# Patient Record
Sex: FEMALE | Race: WHITE | Employment: OTHER | URBAN - METROPOLITAN AREA
[De-identification: names, ages, dates, MRNs, and addresses within clinical notes are randomized per-mention and may not be internally consistent; named-entity substitution may affect disease eponyms.]

---

## 2021-01-01 ENCOUNTER — HOSPITAL ENCOUNTER (INPATIENT)
Age: 71
LOS: 1 days | DRG: 871 | End: 2021-12-20
Attending: EMERGENCY MEDICINE | Admitting: INTERNAL MEDICINE
Payer: COMMERCIAL

## 2021-01-01 ENCOUNTER — APPOINTMENT (OUTPATIENT)
Dept: GENERAL RADIOLOGY | Age: 71
DRG: 871 | End: 2021-01-01
Payer: COMMERCIAL

## 2021-01-01 VITALS
SYSTOLIC BLOOD PRESSURE: 90 MMHG | TEMPERATURE: 98.9 F | DIASTOLIC BLOOD PRESSURE: 50 MMHG | HEART RATE: 108 BPM | HEIGHT: 65 IN | RESPIRATION RATE: 28 BRPM | OXYGEN SATURATION: 78 % | WEIGHT: 122.36 LBS | BODY MASS INDEX: 20.39 KG/M2

## 2021-01-01 DIAGNOSIS — J12.82 PNEUMONIA DUE TO COVID-19 VIRUS: ICD-10-CM

## 2021-01-01 DIAGNOSIS — U07.1 PNEUMONIA DUE TO COVID-19 VIRUS: ICD-10-CM

## 2021-01-01 DIAGNOSIS — K92.2 GASTROINTESTINAL HEMORRHAGE, UNSPECIFIED GASTROINTESTINAL HEMORRHAGE TYPE: ICD-10-CM

## 2021-01-01 DIAGNOSIS — E86.0 DEHYDRATION, SEVERE: ICD-10-CM

## 2021-01-01 DIAGNOSIS — I95.9 HYPOTENSION, UNSPECIFIED HYPOTENSION TYPE: ICD-10-CM

## 2021-01-01 DIAGNOSIS — R09.02 HYPOXIA: ICD-10-CM

## 2021-01-01 DIAGNOSIS — N17.9 ACUTE RENAL FAILURE, UNSPECIFIED ACUTE RENAL FAILURE TYPE (HCC): Primary | ICD-10-CM

## 2021-01-01 DIAGNOSIS — E87.0 HYPERNATREMIA: ICD-10-CM

## 2021-01-01 LAB
A/G RATIO: 0.5 (ref 1.1–2.2)
A/G RATIO: 0.6 (ref 1.1–2.2)
ABO/RH: NORMAL
ALBUMIN SERPL-MCNC: 2.6 G/DL (ref 3.4–5)
ALBUMIN SERPL-MCNC: 2.7 G/DL (ref 3.4–5)
ALP BLD-CCNC: 57 U/L (ref 40–129)
ALP BLD-CCNC: 61 U/L (ref 40–129)
ALT SERPL-CCNC: 14 U/L (ref 10–40)
ALT SERPL-CCNC: 15 U/L (ref 10–40)
ANION GAP SERPL CALCULATED.3IONS-SCNC: 12 MMOL/L (ref 3–16)
ANION GAP SERPL CALCULATED.3IONS-SCNC: 13 MMOL/L (ref 3–16)
ANION GAP SERPL CALCULATED.3IONS-SCNC: 15 MMOL/L (ref 3–16)
ANION GAP SERPL CALCULATED.3IONS-SCNC: 23 MMOL/L (ref 3–16)
ANTIBODY SCREEN: NORMAL
APTT: 27.4 SEC (ref 26.2–38.6)
AST SERPL-CCNC: 21 U/L (ref 15–37)
AST SERPL-CCNC: 25 U/L (ref 15–37)
BACTERIA: ABNORMAL /HPF
BASE EXCESS ARTERIAL: -6.7 MMOL/L (ref -3–3)
BASE EXCESS ARTERIAL: -6.9 MMOL/L (ref -3–3)
BASOPHILS ABSOLUTE: 0 K/UL (ref 0–0.2)
BASOPHILS ABSOLUTE: 0.1 K/UL (ref 0–0.2)
BASOPHILS RELATIVE PERCENT: 0.1 %
BASOPHILS RELATIVE PERCENT: 0.8 %
BILIRUB SERPL-MCNC: 0.3 MG/DL (ref 0–1)
BILIRUB SERPL-MCNC: 0.3 MG/DL (ref 0–1)
BILIRUBIN URINE: ABNORMAL
BLOOD, URINE: NEGATIVE
BUN BLDV-MCNC: 71 MG/DL (ref 7–20)
BUN BLDV-MCNC: 76 MG/DL (ref 7–20)
BUN BLDV-MCNC: 80 MG/DL (ref 7–20)
BUN BLDV-MCNC: 97 MG/DL (ref 7–20)
C-REACTIVE PROTEIN: 101 MG/L (ref 0–5.1)
C-REACTIVE PROTEIN: 99 MG/L (ref 0–5.1)
CALCIUM SERPL-MCNC: 7.8 MG/DL (ref 8.3–10.6)
CALCIUM SERPL-MCNC: 8 MG/DL (ref 8.3–10.6)
CALCIUM SERPL-MCNC: 8.1 MG/DL (ref 8.3–10.6)
CALCIUM SERPL-MCNC: 8.6 MG/DL (ref 8.3–10.6)
CARBOXYHEMOGLOBIN ARTERIAL: 0.8 % (ref 0–1.5)
CARBOXYHEMOGLOBIN ARTERIAL: 1 % (ref 0–1.5)
CHLORIDE BLD-SCNC: 119 MMOL/L (ref 99–110)
CHLORIDE BLD-SCNC: 127 MMOL/L (ref 99–110)
CHLORIDE BLD-SCNC: 128 MMOL/L (ref 99–110)
CHLORIDE BLD-SCNC: 131 MMOL/L (ref 99–110)
CLARITY: ABNORMAL
CO2: 14 MMOL/L (ref 21–32)
CO2: 14 MMOL/L (ref 21–32)
CO2: 15 MMOL/L (ref 21–32)
CO2: 19 MMOL/L (ref 21–32)
COARSE CASTS, UA: ABNORMAL /LPF (ref 0–2)
COLOR: ABNORMAL
CREAT SERPL-MCNC: 2.3 MG/DL (ref 0.6–1.2)
CREAT SERPL-MCNC: 2.5 MG/DL (ref 0.6–1.2)
CREAT SERPL-MCNC: 2.6 MG/DL (ref 0.6–1.2)
CREAT SERPL-MCNC: 4.5 MG/DL (ref 0.6–1.2)
EKG ATRIAL RATE: 90 BPM
EKG DIAGNOSIS: NORMAL
EKG P AXIS: 58 DEGREES
EKG P-R INTERVAL: 112 MS
EKG Q-T INTERVAL: 418 MS
EKG QRS DURATION: 120 MS
EKG QTC CALCULATION (BAZETT): 511 MS
EKG R AXIS: 41 DEGREES
EKG T AXIS: 25 DEGREES
EKG VENTRICULAR RATE: 90 BPM
EOSINOPHILS ABSOLUTE: 0 K/UL (ref 0–0.6)
EOSINOPHILS ABSOLUTE: 0 K/UL (ref 0–0.6)
EOSINOPHILS RELATIVE PERCENT: 0 %
EOSINOPHILS RELATIVE PERCENT: 0.1 %
EPITHELIAL CELLS, UA: 26 /HPF (ref 0–5)
GFR AFRICAN AMERICAN: 12
GFR AFRICAN AMERICAN: 22
GFR AFRICAN AMERICAN: 23
GFR AFRICAN AMERICAN: 25
GFR NON-AFRICAN AMERICAN: 10
GFR NON-AFRICAN AMERICAN: 18
GFR NON-AFRICAN AMERICAN: 19
GFR NON-AFRICAN AMERICAN: 21
GLUCOSE BLD-MCNC: 118 MG/DL (ref 70–99)
GLUCOSE BLD-MCNC: 138 MG/DL (ref 70–99)
GLUCOSE BLD-MCNC: 144 MG/DL (ref 70–99)
GLUCOSE BLD-MCNC: 150 MG/DL (ref 70–99)
GLUCOSE BLD-MCNC: 155 MG/DL (ref 70–99)
GLUCOSE BLD-MCNC: 176 MG/DL (ref 70–99)
GLUCOSE BLD-MCNC: 190 MG/DL (ref 70–99)
GLUCOSE BLD-MCNC: 263 MG/DL (ref 70–99)
GLUCOSE BLD-MCNC: 282 MG/DL (ref 70–99)
GLUCOSE BLD-MCNC: 296 MG/DL (ref 70–99)
GLUCOSE URINE: NEGATIVE MG/DL
HCO3 ARTERIAL: 17.2 MMOL/L (ref 21–29)
HCO3 ARTERIAL: 17.2 MMOL/L (ref 21–29)
HCT VFR BLD CALC: 26.1 % (ref 36–48)
HCT VFR BLD CALC: 27.8 % (ref 36–48)
HCT VFR BLD CALC: 28.7 % (ref 36–48)
HCT VFR BLD CALC: 30.5 % (ref 36–48)
HCT VFR BLD CALC: 32.1 % (ref 36–48)
HEMOGLOBIN, ART, EXTENDED: 9.7 G/DL (ref 12–16)
HEMOGLOBIN, ART, EXTENDED: 9.8 G/DL (ref 12–16)
HEMOGLOBIN: 8.3 G/DL (ref 12–16)
HEMOGLOBIN: 8.6 G/DL (ref 12–16)
HEMOGLOBIN: 8.9 G/DL (ref 12–16)
HEMOGLOBIN: 9.5 G/DL (ref 12–16)
HEMOGLOBIN: 9.5 G/DL (ref 12–16)
INR BLD: 1.3 (ref 0.88–1.12)
INR BLD: 1.43 (ref 0.88–1.12)
KETONES, URINE: ABNORMAL MG/DL
L. PNEUMOPHILA SEROGP 1 UR AG: NORMAL
LACTIC ACID, SEPSIS: 1.4 MMOL/L (ref 0.4–1.9)
LACTIC ACID: 0.9 MMOL/L (ref 0.4–2)
LACTIC ACID: 1.3 MMOL/L (ref 0.4–2)
LACTIC ACID: 2.6 MMOL/L (ref 0.4–2)
LEUKOCYTE ESTERASE, URINE: NEGATIVE
LYMPHOCYTES ABSOLUTE: 0.2 K/UL (ref 1–5.1)
LYMPHOCYTES ABSOLUTE: 0.5 K/UL (ref 1–5.1)
LYMPHOCYTES RELATIVE PERCENT: 1.8 %
LYMPHOCYTES RELATIVE PERCENT: 3.7 %
MAGNESIUM: 2.6 MG/DL (ref 1.8–2.4)
MCH RBC QN AUTO: 27.7 PG (ref 26–34)
MCH RBC QN AUTO: 27.7 PG (ref 26–34)
MCHC RBC AUTO-ENTMCNC: 30.9 G/DL (ref 31–36)
MCHC RBC AUTO-ENTMCNC: 31 G/DL (ref 31–36)
MCV RBC AUTO: 89.3 FL (ref 80–100)
MCV RBC AUTO: 89.7 FL (ref 80–100)
METHEMOGLOBIN ARTERIAL: 0.6 %
METHEMOGLOBIN ARTERIAL: 0.8 %
MICROSCOPIC EXAMINATION: YES
MONOCYTES ABSOLUTE: 0.4 K/UL (ref 0–1.3)
MONOCYTES ABSOLUTE: 0.5 K/UL (ref 0–1.3)
MONOCYTES RELATIVE PERCENT: 3.6 %
MONOCYTES RELATIVE PERCENT: 3.8 %
NEUTROPHILS ABSOLUTE: 12.9 K/UL (ref 1.7–7.7)
NEUTROPHILS ABSOLUTE: 9.9 K/UL (ref 1.7–7.7)
NEUTROPHILS RELATIVE PERCENT: 91.6 %
NEUTROPHILS RELATIVE PERCENT: 94.5 %
NITRITE, URINE: NEGATIVE
O2 SAT, ARTERIAL: 90.5 %
O2 SAT, ARTERIAL: 93 %
O2 THERAPY: ABNORMAL
O2 THERAPY: ABNORMAL
OCCULT BLOOD DIAGNOSTIC: ABNORMAL
PCO2 ARTERIAL: 28.3 MMHG (ref 35–45)
PCO2 ARTERIAL: 28.8 MMHG (ref 35–45)
PDW BLD-RTO: 15.9 % (ref 12.4–15.4)
PDW BLD-RTO: 16.2 % (ref 12.4–15.4)
PERFORMED ON: ABNORMAL
PH ARTERIAL: 7.38 (ref 7.35–7.45)
PH ARTERIAL: 7.39 (ref 7.35–7.45)
PH UA: 5 (ref 5–8)
PLATELET # BLD: 362 K/UL (ref 135–450)
PLATELET # BLD: 503 K/UL (ref 135–450)
PMV BLD AUTO: 8.3 FL (ref 5–10.5)
PMV BLD AUTO: 8.6 FL (ref 5–10.5)
PO2 ARTERIAL: 61 MMHG (ref 75–108)
PO2 ARTERIAL: 70.8 MMHG (ref 75–108)
POTASSIUM REFLEX MAGNESIUM: 3.4 MMOL/L (ref 3.5–5.1)
POTASSIUM REFLEX MAGNESIUM: 4.1 MMOL/L (ref 3.5–5.1)
POTASSIUM SERPL-SCNC: 3.5 MMOL/L (ref 3.5–5.1)
POTASSIUM SERPL-SCNC: 3.8 MMOL/L (ref 3.5–5.1)
PRO-BNP: 3345 PG/ML (ref 0–124)
PROCALCITONIN: 0.13 NG/ML (ref 0–0.15)
PROTEIN UA: 30 MG/DL
PROTHROMBIN TIME: 14.8 SEC (ref 9.9–12.7)
PROTHROMBIN TIME: 16.4 SEC (ref 9.9–12.7)
RBC # BLD: 3.12 M/UL (ref 4–5.2)
RBC # BLD: 3.2 M/UL (ref 4–5.2)
RBC UA: 1 /HPF (ref 0–4)
SODIUM BLD-SCNC: 156 MMOL/L (ref 136–145)
SODIUM BLD-SCNC: 158 MMOL/L (ref 136–145)
SPECIFIC GRAVITY UA: 1.02 (ref 1–1.03)
STREP PNEUMONIAE ANTIGEN, URINE: NORMAL
TCO2 ARTERIAL: 18.1 MMOL/L
TCO2 ARTERIAL: 18.1 MMOL/L
TOTAL PROTEIN: 6.9 G/DL (ref 6.4–8.2)
TOTAL PROTEIN: 7.9 G/DL (ref 6.4–8.2)
TROPONIN: 0.04 NG/ML
URINE CULTURE, ROUTINE: NORMAL
URINE REFLEX TO CULTURE: YES
URINE TYPE: ABNORMAL
UROBILINOGEN, URINE: 0.2 E.U./DL
VITAMIN D 25-HYDROXY: 10.2 NG/ML
WBC # BLD: 10.5 K/UL (ref 4–11)
WBC # BLD: 14 K/UL (ref 4–11)
WBC UA: 14 /HPF (ref 0–5)

## 2021-01-01 PROCEDURE — 82270 OCCULT BLOOD FECES: CPT

## 2021-01-01 PROCEDURE — 96361 HYDRATE IV INFUSION ADD-ON: CPT

## 2021-01-01 PROCEDURE — C9113 INJ PANTOPRAZOLE SODIUM, VIA: HCPCS | Performed by: INTERNAL MEDICINE

## 2021-01-01 PROCEDURE — 2500000003 HC RX 250 WO HCPCS: Performed by: EMERGENCY MEDICINE

## 2021-01-01 PROCEDURE — 85610 PROTHROMBIN TIME: CPT

## 2021-01-01 PROCEDURE — 99284 EMERGENCY DEPT VISIT MOD MDM: CPT

## 2021-01-01 PROCEDURE — XW033H5 INTRODUCTION OF TOCILIZUMAB INTO PERIPHERAL VEIN, PERCUTANEOUS APPROACH, NEW TECHNOLOGY GROUP 5: ICD-10-PCS | Performed by: STUDENT IN AN ORGANIZED HEALTH CARE EDUCATION/TRAINING PROGRAM

## 2021-01-01 PROCEDURE — 86900 BLOOD TYPING SEROLOGIC ABO: CPT

## 2021-01-01 PROCEDURE — 6360000002 HC RX W HCPCS: Performed by: INTERNAL MEDICINE

## 2021-01-01 PROCEDURE — 36600 WITHDRAWAL OF ARTERIAL BLOOD: CPT

## 2021-01-01 PROCEDURE — 86140 C-REACTIVE PROTEIN: CPT

## 2021-01-01 PROCEDURE — 2580000003 HC RX 258: Performed by: INTERNAL MEDICINE

## 2021-01-01 PROCEDURE — 2000000000 HC ICU R&B

## 2021-01-01 PROCEDURE — 87449 NOS EACH ORGANISM AG IA: CPT

## 2021-01-01 PROCEDURE — 86901 BLOOD TYPING SEROLOGIC RH(D): CPT

## 2021-01-01 PROCEDURE — 82306 VITAMIN D 25 HYDROXY: CPT

## 2021-01-01 PROCEDURE — 80053 COMPREHEN METABOLIC PANEL: CPT

## 2021-01-01 PROCEDURE — 96360 HYDRATION IV INFUSION INIT: CPT

## 2021-01-01 PROCEDURE — 6370000000 HC RX 637 (ALT 250 FOR IP): Performed by: STUDENT IN AN ORGANIZED HEALTH CARE EDUCATION/TRAINING PROGRAM

## 2021-01-01 PROCEDURE — 85014 HEMATOCRIT: CPT

## 2021-01-01 PROCEDURE — 83880 ASSAY OF NATRIURETIC PEPTIDE: CPT

## 2021-01-01 PROCEDURE — 86850 RBC ANTIBODY SCREEN: CPT

## 2021-01-01 PROCEDURE — C9113 INJ PANTOPRAZOLE SODIUM, VIA: HCPCS | Performed by: EMERGENCY MEDICINE

## 2021-01-01 PROCEDURE — 85025 COMPLETE CBC W/AUTO DIFF WBC: CPT

## 2021-01-01 PROCEDURE — 83605 ASSAY OF LACTIC ACID: CPT

## 2021-01-01 PROCEDURE — 94660 CPAP INITIATION&MGMT: CPT

## 2021-01-01 PROCEDURE — 6360000002 HC RX W HCPCS: Performed by: EMERGENCY MEDICINE

## 2021-01-01 PROCEDURE — 85018 HEMOGLOBIN: CPT

## 2021-01-01 PROCEDURE — APPNB15 APP NON BILLABLE TIME 0-15 MINS: Performed by: NURSE PRACTITIONER

## 2021-01-01 PROCEDURE — 71045 X-RAY EXAM CHEST 1 VIEW: CPT

## 2021-01-01 PROCEDURE — 96367 TX/PROPH/DG ADDL SEQ IV INF: CPT

## 2021-01-01 PROCEDURE — 83735 ASSAY OF MAGNESIUM: CPT

## 2021-01-01 PROCEDURE — 84484 ASSAY OF TROPONIN QUANT: CPT

## 2021-01-01 PROCEDURE — 87040 BLOOD CULTURE FOR BACTERIA: CPT

## 2021-01-01 PROCEDURE — 6360000002 HC RX W HCPCS: Performed by: NURSE PRACTITIONER

## 2021-01-01 PROCEDURE — 36556 INSERT NON-TUNNEL CV CATH: CPT

## 2021-01-01 PROCEDURE — 96365 THER/PROPH/DIAG IV INF INIT: CPT

## 2021-01-01 PROCEDURE — 2700000000 HC OXYGEN THERAPY PER DAY

## 2021-01-01 PROCEDURE — 84145 PROCALCITONIN (PCT): CPT

## 2021-01-01 PROCEDURE — 2500000003 HC RX 250 WO HCPCS: Performed by: INTERNAL MEDICINE

## 2021-01-01 PROCEDURE — 93005 ELECTROCARDIOGRAM TRACING: CPT | Performed by: EMERGENCY MEDICINE

## 2021-01-01 PROCEDURE — 85730 THROMBOPLASTIN TIME PARTIAL: CPT

## 2021-01-01 PROCEDURE — 87086 URINE CULTURE/COLONY COUNT: CPT

## 2021-01-01 PROCEDURE — 36415 COLL VENOUS BLD VENIPUNCTURE: CPT

## 2021-01-01 PROCEDURE — 82803 BLOOD GASES ANY COMBINATION: CPT

## 2021-01-01 PROCEDURE — 96375 TX/PRO/DX INJ NEW DRUG ADDON: CPT

## 2021-01-01 PROCEDURE — 2580000003 HC RX 258: Performed by: EMERGENCY MEDICINE

## 2021-01-01 PROCEDURE — 81001 URINALYSIS AUTO W/SCOPE: CPT

## 2021-01-01 PROCEDURE — 93010 ELECTROCARDIOGRAM REPORT: CPT | Performed by: INTERNAL MEDICINE

## 2021-01-01 PROCEDURE — 99291 CRITICAL CARE FIRST HOUR: CPT | Performed by: INTERNAL MEDICINE

## 2021-01-01 RX ORDER — MORPHINE SULFATE 2 MG/ML
2 INJECTION, SOLUTION INTRAMUSCULAR; INTRAVENOUS
Status: DISCONTINUED | OUTPATIENT
Start: 2021-01-01 | End: 2021-01-01 | Stop reason: HOSPADM

## 2021-01-01 RX ORDER — INSULIN GLARGINE 100 [IU]/ML
20 INJECTION, SOLUTION SUBCUTANEOUS NIGHTLY
Status: DISCONTINUED | OUTPATIENT
Start: 2021-01-01 | End: 2021-01-01

## 2021-01-01 RX ORDER — QUETIAPINE FUMARATE 25 MG/1
25 TABLET, FILM COATED ORAL 2 TIMES DAILY
COMMUNITY

## 2021-01-01 RX ORDER — DEXTROSE MONOHYDRATE 25 G/50ML
12.5 INJECTION, SOLUTION INTRAVENOUS PRN
Status: DISCONTINUED | OUTPATIENT
Start: 2021-01-01 | End: 2021-01-01

## 2021-01-01 RX ORDER — DEXAMETHASONE SODIUM PHOSPHATE 10 MG/ML
10 INJECTION INTRAMUSCULAR; INTRAVENOUS EVERY 24 HOURS
Status: DISCONTINUED | OUTPATIENT
Start: 2021-12-24 | End: 2021-01-01

## 2021-01-01 RX ORDER — ACETAMINOPHEN 325 MG/1
650 TABLET ORAL EVERY 6 HOURS PRN
COMMUNITY

## 2021-01-01 RX ORDER — GUAIFENESIN/DEXTROMETHORPHAN 100-10MG/5
5 SYRUP ORAL EVERY 4 HOURS PRN
Status: DISCONTINUED | OUTPATIENT
Start: 2021-01-01 | End: 2021-01-01

## 2021-01-01 RX ORDER — DEXAMETHASONE SODIUM PHOSPHATE 4 MG/ML
4 INJECTION, SOLUTION INTRA-ARTICULAR; INTRALESIONAL; INTRAMUSCULAR; INTRAVENOUS; SOFT TISSUE ONCE
Status: COMPLETED | OUTPATIENT
Start: 2021-01-01 | End: 2021-01-01

## 2021-01-01 RX ORDER — DEXTROSE MONOHYDRATE 50 MG/ML
100 INJECTION, SOLUTION INTRAVENOUS PRN
Status: DISCONTINUED | OUTPATIENT
Start: 2021-01-01 | End: 2021-01-01

## 2021-01-01 RX ORDER — DEXTROSE AND SODIUM CHLORIDE 5; .45 G/100ML; G/100ML
INJECTION, SOLUTION INTRAVENOUS CONTINUOUS
Status: DISCONTINUED | OUTPATIENT
Start: 2021-01-01 | End: 2021-01-01

## 2021-01-01 RX ORDER — 0.9 % SODIUM CHLORIDE 0.9 %
30 INTRAVENOUS SOLUTION INTRAVENOUS PRN
Status: DISCONTINUED | OUTPATIENT
Start: 2021-01-01 | End: 2021-01-01

## 2021-01-01 RX ORDER — PANTOPRAZOLE SODIUM 40 MG/10ML
40 INJECTION, POWDER, LYOPHILIZED, FOR SOLUTION INTRAVENOUS DAILY
Status: DISCONTINUED | OUTPATIENT
Start: 2021-01-01 | End: 2021-01-01

## 2021-01-01 RX ORDER — POTASSIUM CHLORIDE 1.5 G/1.77G
20 POWDER, FOR SOLUTION ORAL DAILY
COMMUNITY

## 2021-01-01 RX ORDER — OMEPRAZOLE 20 MG/1
20 CAPSULE, DELAYED RELEASE ORAL DAILY
COMMUNITY

## 2021-01-01 RX ORDER — ASPIRIN 81 MG/1
81 TABLET, CHEWABLE ORAL DAILY
COMMUNITY

## 2021-01-01 RX ORDER — CASTOR OIL AND BALSAM, PERU 788; 87 MG/G; MG/G
OINTMENT TOPICAL 2 TIMES DAILY
Status: DISCONTINUED | OUTPATIENT
Start: 2021-01-01 | End: 2021-01-01

## 2021-01-01 RX ORDER — LISINOPRIL 5 MG/1
5 TABLET ORAL DAILY
COMMUNITY

## 2021-01-01 RX ORDER — ACETAMINOPHEN 650 MG/1
650 SUPPOSITORY RECTAL EVERY 6 HOURS PRN
Status: DISCONTINUED | OUTPATIENT
Start: 2021-01-01 | End: 2021-01-01

## 2021-01-01 RX ORDER — 0.9 % SODIUM CHLORIDE 0.9 %
1000 INTRAVENOUS SOLUTION INTRAVENOUS ONCE
Status: COMPLETED | OUTPATIENT
Start: 2021-01-01 | End: 2021-01-01

## 2021-01-01 RX ORDER — NICOTINE POLACRILEX 4 MG
15 LOZENGE BUCCAL PRN
Status: DISCONTINUED | OUTPATIENT
Start: 2021-01-01 | End: 2021-01-01

## 2021-01-01 RX ORDER — QUETIAPINE FUMARATE 25 MG/1
25 TABLET, FILM COATED ORAL 2 TIMES DAILY
Status: DISCONTINUED | OUTPATIENT
Start: 2021-01-01 | End: 2021-01-01 | Stop reason: HOSPADM

## 2021-01-01 RX ORDER — LORAZEPAM 2 MG/ML
4 INJECTION INTRAMUSCULAR
Status: DISCONTINUED | OUTPATIENT
Start: 2021-01-01 | End: 2021-01-01 | Stop reason: HOSPADM

## 2021-01-01 RX ORDER — LORAZEPAM 2 MG/ML
2 INJECTION INTRAMUSCULAR
Status: DISCONTINUED | OUTPATIENT
Start: 2021-01-01 | End: 2021-01-01 | Stop reason: HOSPADM

## 2021-01-01 RX ORDER — MORPHINE SULFATE 4 MG/ML
4 INJECTION, SOLUTION INTRAMUSCULAR; INTRAVENOUS
Status: DISCONTINUED | OUTPATIENT
Start: 2021-01-01 | End: 2021-01-01 | Stop reason: HOSPADM

## 2021-01-01 RX ORDER — SODIUM CHLORIDE 9 MG/ML
10 INJECTION INTRAVENOUS DAILY
Status: DISCONTINUED | OUTPATIENT
Start: 2021-01-01 | End: 2021-01-01

## 2021-01-01 RX ORDER — CILOSTAZOL 50 MG/1
50 TABLET ORAL 2 TIMES DAILY
COMMUNITY

## 2021-01-01 RX ORDER — ONDANSETRON 4 MG/1
4 TABLET, ORALLY DISINTEGRATING ORAL EVERY 8 HOURS PRN
Status: DISCONTINUED | OUTPATIENT
Start: 2021-01-01 | End: 2021-01-01

## 2021-01-01 RX ORDER — ACETAMINOPHEN 325 MG/1
650 TABLET ORAL EVERY 6 HOURS PRN
Status: DISCONTINUED | OUTPATIENT
Start: 2021-01-01 | End: 2021-01-01

## 2021-01-01 RX ORDER — SODIUM CHLORIDE 0.9 % (FLUSH) 0.9 %
5-40 SYRINGE (ML) INJECTION PRN
Status: DISCONTINUED | OUTPATIENT
Start: 2021-01-01 | End: 2021-01-01 | Stop reason: HOSPADM

## 2021-01-01 RX ORDER — POLYETHYLENE GLYCOL 3350 17 G/17G
17 POWDER, FOR SOLUTION ORAL DAILY PRN
Status: DISCONTINUED | OUTPATIENT
Start: 2021-01-01 | End: 2021-01-01

## 2021-01-01 RX ORDER — LANOLIN ALCOHOL/MO/W.PET/CERES
3 CREAM (GRAM) TOPICAL NIGHTLY
COMMUNITY

## 2021-01-01 RX ORDER — ONDANSETRON 2 MG/ML
4 INJECTION INTRAMUSCULAR; INTRAVENOUS EVERY 6 HOURS PRN
Status: DISCONTINUED | OUTPATIENT
Start: 2021-01-01 | End: 2021-01-01

## 2021-01-01 RX ORDER — CALCIUM GLUCONATE 20 MG/ML
1000 INJECTION, SOLUTION INTRAVENOUS ONCE
Status: DISCONTINUED | OUTPATIENT
Start: 2021-01-01 | End: 2021-01-01

## 2021-01-01 RX ORDER — SODIUM CHLORIDE 0.9 % (FLUSH) 0.9 %
5-40 SYRINGE (ML) INJECTION EVERY 12 HOURS SCHEDULED
Status: DISCONTINUED | OUTPATIENT
Start: 2021-01-01 | End: 2021-01-01 | Stop reason: HOSPADM

## 2021-01-01 RX ORDER — POTASSIUM CHLORIDE 29.8 MG/ML
20 INJECTION INTRAVENOUS
Status: COMPLETED | OUTPATIENT
Start: 2021-01-01 | End: 2021-01-01

## 2021-01-01 RX ORDER — SODIUM CHLORIDE 9 MG/ML
25 INJECTION, SOLUTION INTRAVENOUS PRN
Status: DISCONTINUED | OUTPATIENT
Start: 2021-01-01 | End: 2021-01-01

## 2021-01-01 RX ADMIN — INSULIN LISPRO 2 UNITS: 100 INJECTION, SOLUTION INTRAVENOUS; SUBCUTANEOUS at 03:56

## 2021-01-01 RX ADMIN — SODIUM BICARBONATE: 84 INJECTION, SOLUTION INTRAVENOUS at 08:54

## 2021-01-01 RX ADMIN — LORAZEPAM 4 MG: 2 INJECTION INTRAMUSCULAR; INTRAVENOUS at 15:44

## 2021-01-01 RX ADMIN — DEXAMETHASONE SODIUM PHOSPHATE 20 MG: 4 INJECTION, SOLUTION INTRAMUSCULAR; INTRAVENOUS at 20:08

## 2021-01-01 RX ADMIN — PANTOPRAZOLE SODIUM 40 MG: 40 INJECTION, POWDER, FOR SOLUTION INTRAVENOUS at 11:59

## 2021-01-01 RX ADMIN — DEXTROSE AND SODIUM CHLORIDE: 5; 450 INJECTION, SOLUTION INTRAVENOUS at 20:06

## 2021-01-01 RX ADMIN — INSULIN LISPRO 2 UNITS: 100 INJECTION, SOLUTION INTRAVENOUS; SUBCUTANEOUS at 08:56

## 2021-01-01 RX ADMIN — MORPHINE SULFATE 2 MG: 2 INJECTION, SOLUTION INTRAMUSCULAR; INTRAVENOUS at 16:16

## 2021-01-01 RX ADMIN — SODIUM CHLORIDE 10 ML: 9 INJECTION INTRAMUSCULAR; INTRAVENOUS; SUBCUTANEOUS at 11:59

## 2021-01-01 RX ADMIN — Medication 2 MCG/MIN: at 12:57

## 2021-01-01 RX ADMIN — SODIUM CHLORIDE 8 MG/HR: 9 INJECTION, SOLUTION INTRAVENOUS at 03:56

## 2021-01-01 RX ADMIN — DEXTROSE AND SODIUM CHLORIDE: 5; 450 INJECTION, SOLUTION INTRAVENOUS at 03:55

## 2021-01-01 RX ADMIN — POTASSIUM CHLORIDE 20 MEQ: 29.8 INJECTION, SOLUTION INTRAVENOUS at 11:37

## 2021-01-01 RX ADMIN — Medication 10 ML: at 08:44

## 2021-01-01 RX ADMIN — POTASSIUM CHLORIDE 20 MEQ: 29.8 INJECTION, SOLUTION INTRAVENOUS at 13:23

## 2021-01-01 RX ADMIN — SODIUM CHLORIDE 1000 ML: 9 INJECTION, SOLUTION INTRAVENOUS at 10:08

## 2021-01-01 RX ADMIN — DEXTROSE AND SODIUM CHLORIDE: 5; 450 INJECTION, SOLUTION INTRAVENOUS at 13:00

## 2021-01-01 RX ADMIN — Medication 10 ML: at 20:11

## 2021-01-01 RX ADMIN — LORAZEPAM 2 MG: 2 INJECTION INTRAMUSCULAR; INTRAVENOUS at 16:16

## 2021-01-01 RX ADMIN — INSULIN GLARGINE 20 UNITS: 100 INJECTION, SOLUTION SUBCUTANEOUS at 22:20

## 2021-01-01 RX ADMIN — INSULIN LISPRO 6 UNITS: 100 INJECTION, SOLUTION INTRAVENOUS; SUBCUTANEOUS at 22:20

## 2021-01-01 RX ADMIN — MORPHINE SULFATE 4 MG: 4 INJECTION, SOLUTION INTRAMUSCULAR; INTRAVENOUS at 15:44

## 2021-01-01 RX ADMIN — SODIUM CHLORIDE 8 MG/HR: 9 INJECTION, SOLUTION INTRAVENOUS at 20:09

## 2021-01-01 RX ADMIN — DEXAMETHASONE SODIUM PHOSPHATE 4 MG: 4 INJECTION, SOLUTION INTRAMUSCULAR; INTRAVENOUS at 12:59

## 2021-01-01 RX ADMIN — SODIUM CHLORIDE 1000 ML: 9 INJECTION, SOLUTION INTRAVENOUS at 10:28

## 2021-01-01 RX ADMIN — SODIUM CHLORIDE 5 ML/HR: 9 INJECTION, SOLUTION INTRAVENOUS at 20:08

## 2021-01-01 ASSESSMENT — PAIN SCALES - PAIN ASSESSMENT IN ADVANCED DEMENTIA (PAINAD)
TOTALSCORE: 1
FACIALEXPRESSION: 0
BODYLANGUAGE: 1
BODYLANGUAGE: 0
FACIALEXPRESSION: 0
CONSOLABILITY: 0
CONSOLABILITY: 0
BODYLANGUAGE: 0
BREATHING: 1
BREATHING: 0
NEGVOCALIZATION: 1
BODYLANGUAGE: 0
TOTALSCORE: 8
NEGVOCALIZATION: 1
CONSOLABILITY: 2
BODYLANGUAGE: 2
BREATHING: 1
BREATHING: 1
TOTALSCORE: 1
TOTALSCORE: 3
NEGVOCALIZATION: 0
FACIALEXPRESSION: 2
CONSOLABILITY: 2
NEGVOCALIZATION: 1
NEGVOCALIZATION: 0
BREATHING: 1
BREATHING: 0
TOTALSCORE: 0
CONSOLABILITY: 0
FACIALEXPRESSION: 0
CONSOLABILITY: 0
TOTALSCORE: 8
NEGVOCALIZATION: 1
FACIALEXPRESSION: 2
FACIALEXPRESSION: 0
BODYLANGUAGE: 2

## 2021-01-01 ASSESSMENT — ENCOUNTER SYMPTOMS: SHORTNESS OF BREATH: 1

## 2021-12-19 PROBLEM — U07.1 PNEUMONIA DUE TO COVID-19 VIRUS: Status: ACTIVE | Noted: 2021-01-01

## 2021-12-19 PROBLEM — J12.82 PNEUMONIA DUE TO COVID-19 VIRUS: Status: ACTIVE | Noted: 2021-01-01

## 2021-12-19 NOTE — PROGRESS NOTES
Pt arrived to ICU bed 2110 via stretcher with RT Arley Canada and ER RN Tricia Denton. Bedside report received. Pt is responsive to pain, opens eyes to pain and moaning with care but unable to follow commands at this time. Pt on BIPAP 50% Rate 10 10/4 with oxygen saturation 94%. Upon arrival to ICU Dr Sujatha Norris on unit and made aware of new consult. Case discussed. MD stated he will see patient. Upon arrival patient found with large bowel movement loose appears  Dark brown with  smell of blood. Bed alarm activated.

## 2021-12-19 NOTE — ED TRIAGE NOTES
Patient admitted to ED via [facility] from [facility]. Per EMS, patient is COVID + and they were called this morning because patient was less responsive. Patient typically mentates, but is only responsive to pain at this time. EMS states that patient was 70% on 3L/min via nasal cannula. They placed her on a 15L/min via NRB. Patient is currently satting at 89% on NRB. Temperature is 100.5 rectally. BP is 79/55. HR is between . History of alzheimer's, COPD, CAD, HTN, and GERD.

## 2021-12-19 NOTE — ED PROVIDER NOTES
Schizoaffective disorder, unspecified ([facility] [address] [facility]     Unspecified abnormalities of gait and mobility          SURGICAL HISTORY   History reviewed. No pertinent surgical history. CURRENTMEDICATIONS       Previous Medications    No medications on file       ALLERGIES     Penicillins and Ibuprofen    FAMILY HISTORY     History reviewed. No pertinent family history. SOCIAL HISTORY       Social History     Socioeconomic History    Marital status: Single     Spouse name: None    Number of children: None    Years of education: None    Highest education level: None   Occupational History    None   Tobacco Use    Smoking status: Unknown If Ever Smoked    Smokeless tobacco: None   Substance and Sexual Activity    Alcohol use: None    Drug use: None    Sexual activity: None   Other Topics Concern    None   Social History Narrative    None     Social Determinants of Health     Financial Resource Strain:     Difficulty of Paying Living Expenses: Not on file   Food Insecurity:     Worried About Running Out of Food in the Last Year: Not on file    Alfredito of Food in the Last Year: Not on file   Transportation Needs:     Lack of Transportation (Medical): Not on file    Lack of Transportation (Non-Medical):  Not on file   Physical Activity:     Days of Exercise per Week: Not on file    Minutes of Exercise per Session: Not on file   Stress:     Feeling of Stress : Not on file   Social Connections:     Frequency of Communication with Friends and Family: Not on file    Frequency of Social Gatherings with Friends and Family: Not on file    Attends Worship Services: Not on file    Active Member of Clubs or Organizations: Not on file    Attends Club or Organization Meetings: Not on file    Marital Status: Not on file   Intimate Partner Violence:     Fear of Current or Ex-Partner: Not on file    Emotionally Abused: Not on file    Physically Abused: Not on file    Sexually Abused: Not on file   Housing Stability:     Unable to Pay for Housing in the Last Year: Not on file    Number of Places Lived in the Last Year: Not on file    Unstable Housing in the Last Year: Not on file       SCREENINGS             PHYSICAL EXAM    (up to 7 for level 4, 8 or more for level 5)     ED Triage Vitals [12/19/21 0944]   BP Temp Temp Source Pulse Resp SpO2 Height Weight   (!) 79/55 100.5 °F (38.1 °C) Rectal 103 27 91 % -- --      rectal temperature is 100.5 °F (38.1 °C). Her blood pressure is 139/48 (abnormal) and her pulse is 76. Her respiration is 14 and oxygen saturation is 91%. Physical Exam  Constitutional:       General: She is in acute distress. Appearance: She is well-developed. She is ill-appearing and toxic-appearing. She is not diaphoretic. Comments: This is a lethargic ill-appearing female. She does, however, have a gag reflex. HENT:      Head: Normocephalic and atraumatic. Right Ear: External ear normal.      Left Ear: External ear normal.      Mouth/Throat:      Mouth: Mucous membranes are dry. Eyes:      General: No scleral icterus. Right eye: No discharge. Left eye: No discharge. Neck:      Thyroid: No thyromegaly. Vascular: No JVD. Trachea: No tracheal deviation. Cardiovascular:      Rate and Rhythm: Normal rate and regular rhythm. Heart sounds: No murmur heard. No friction rub. No gallop. Pulmonary:      Effort: Tachypnea and respiratory distress present. Breath sounds: Decreased air movement present. No stridor. Rhonchi present. No wheezing or rales. Abdominal:      General: There is no distension. Palpations: Abdomen is soft. Tenderness: There is no abdominal tenderness. There is no guarding or rebound. Musculoskeletal:         General: No tenderness. Cervical back: Normal range of motion. Skin:     General: Skin is warm and dry. Findings: No rash (On exposed body surfaces).    Neurological:      General: No focal deficit present. Mental Status: She is unresponsive. Comments: With IV hydration mental status did improve to at least responding to painful stimuli. When she does she moves all 4 extremities symmetrically. Psychiatric:         Behavior: Behavior is uncooperative.          DIAGNOSTIC RESULTS   LABS:    Results for orders placed or performed during the hospital encounter of 12/19/21   CBC Auto Differential   Result Value Ref Range    WBC 14.0 (H) 4.0 - 11.0 K/uL    RBC 3.12 (L) 4.00 - 5.20 M/uL    Hemoglobin 8.6 (L) 12.0 - 16.0 g/dL    Hematocrit 27.8 (L) 36.0 - 48.0 %    MCV 89.3 80.0 - 100.0 fL    MCH 27.7 26.0 - 34.0 pg    MCHC 31.0 31.0 - 36.0 g/dL    RDW 16.2 (H) 12.4 - 15.4 %    Platelets 470 (H) 849 - 450 K/uL    MPV 8.3 5.0 - 10.5 fL    Neutrophils % 91.6 %    Lymphocytes % 3.7 %    Monocytes % 3.8 %    Eosinophils % 0.1 %    Basophils % 0.8 %    Neutrophils Absolute 12.9 (H) 1.7 - 7.7 K/uL    Lymphocytes Absolute 0.5 (L) 1.0 - 5.1 K/uL    Monocytes Absolute 0.5 0.0 - 1.3 K/uL    Eosinophils Absolute 0.0 0.0 - 0.6 K/uL    Basophils Absolute 0.1 0.0 - 0.2 K/uL   Comprehensive Metabolic Panel w/ Reflex to MG   Result Value Ref Range    Sodium 156 (H) 136 - 145 mmol/L    Potassium reflex Magnesium 4.1 3.5 - 5.1 mmol/L    Chloride 119 (H) 99 - 110 mmol/L    CO2 14 (LL) 21 - 32 mmol/L    Anion Gap 23 (H) 3 - 16    Glucose 138 (H) 70 - 99 mg/dL    BUN 97 (HH) 7 - 20 mg/dL    CREATININE 4.5 (H) 0.6 - 1.2 mg/dL    GFR Non-African American 10 (A) >60    GFR  12 (A) >60    Calcium 8.6 8.3 - 10.6 mg/dL    Total Protein 7.9 6.4 - 8.2 g/dL    Albumin 2.7 (L) 3.4 - 5.0 g/dL    Albumin/Globulin Ratio 0.5 (L) 1.1 - 2.2    Total Bilirubin 0.3 0.0 - 1.0 mg/dL    Alkaline Phosphatase 61 40 - 129 U/L    ALT 14 10 - 40 U/L    AST 21 15 - 37 U/L   Troponin   Result Value Ref Range    Troponin 0.04 (H) <0.01 ng/mL   Brain Natriuretic Peptide   Result Value Ref Range    Pro-BNP 3,345 (H) 0 - 124 pg/mL Protime-INR   Result Value Ref Range    Protime 14.8 (H) 9.9 - 12.7 sec    INR 1.30 (H) 0.88 - 1.12   APTT   Result Value Ref Range    aPTT 27.4 26.2 - 38.6 sec   Lactate, Sepsis   Result Value Ref Range    Lactic Acid, Sepsis 1.4 0.4 - 1.9 mmol/L   Urinalysis Reflex to Culture    Specimen: Urine, clean catch   Result Value Ref Range    Color, UA DK YELLOW Straw/Yellow    Clarity, UA TURBID (A) Clear    Glucose, Ur Negative Negative mg/dL    Bilirubin Urine MODERATE (A) Negative    Ketones, Urine TRACE (A) Negative mg/dL    Specific Gravity, UA 1.025 1.005 - 1.030    Blood, Urine Negative Negative    pH, UA 5.0 5.0 - 8.0    Protein, UA 30 (A) Negative mg/dL    Urobilinogen, Urine 0.2 <2.0 E.U./dL    Nitrite, Urine Negative Negative    Leukocyte Esterase, Urine Negative Negative    Microscopic Examination YES     Urine Type NotGiven     Urine Reflex to Culture Yes    Blood Gas, Arterial   Result Value Ref Range    pH, Arterial 7.392 7.350 - 7.450    pCO2, Arterial 28.3 (L) 35.0 - 45.0 mmHg    pO2, Arterial 70.8 (L) 75.0 - 108.0 mmHg    HCO3, Arterial 17.2 (L) 21.0 - 29.0 mmol/L    Base Excess, Arterial -6.7 (L) -3.0 - 3.0 mmol/L    Hemoglobin, Art, Extended 9.7 (L) 12.0 - 16.0 g/dL    O2 Sat, Arterial 93.0 >92 %    Carboxyhgb, Arterial 1.0 0.0 - 1.5 %    Methemoglobin, Arterial 0.6 <1.5 %    TCO2, Arterial 18.1 Not Established mmol/L    O2 Therapy Unknown    Blood Occult Stool #1   Result Value Ref Range    Occult Blood Diagnostic Result: POSITIVE  Normal range: Negative   (A)    Microscopic Urinalysis   Result Value Ref Range    Coarse Casts, UA 3-5 (A) 0 - 2 /LPF    Bacteria, UA 1+ (A) None Seen /HPF    WBC, UA 14 (H) 0 - 5 /HPF    RBC, UA 1 0 - 4 /HPF    Epithelial Cells, UA 26 (H) 0 - 5 /HPF   TYPE AND SCREEN   Result Value Ref Range    ABO/Rh O NEG     Antibody Screen NEG        All other labs were within normal range or not returned as of this dictation. EKG:  All EKG's are interpreted by the Emergency Department Physician who either signs orCo-signs this chart in the absence of a cardiologist.    EKG visualized preliminarily interpreted by myself. Rhythm is sinus. There is a incomplete right bundle branch block. Nonspecific ST findings but no signs of acute ST elevation or acutely ischemic appearing pattern. The axis is 41. The rate is 90. RADIOLOGY:   Non-plain film images such as CT, Ultrasound and MRI are read by the radiologist. [facility] radiographic images are visualized and preliminarily interpreted by the  EDProvider with the below findings:    XR CHEST PORTABLE    Result Date: 12/19/2021  EXAMINATION: ONE XRAY VIEW OF THE CHEST 12/19/2021 10:09 am COMPARISON: None. HISTORY: ORDERING SYSTEM PROVIDED HISTORY: infection TECHNOLOGIST PROVIDED HISTORY: Reason for exam:->infection Reason for Exam: Respiratory Distress (COVID+, responsive to pain FINDINGS: The cardiomediastinal silhouette is unremarkable status post median sternotomy. Aortic vascular calcification. Peripheral ground-glass opacification in the mid and lower lung fields. No pleural fluid or evidence of overt failure. Peripheral ground-glass pulmonary infiltrates most consistent with COVID-19 pneumonia. PROCEDURES   Unless otherwise noted below, none     Central Line    Date/Time: 12/19/2021 12:30 PM  Performed by: Narendra Moser MD  Authorized by: Narendra Moser MD     Consent:     Consent obtained:  Emergent situation  Pre-procedure details:     Hand hygiene: Hand hygiene performed prior to insertion      Sterile barrier technique: All elements of maximal sterile technique followed      Skin preparation:  2% chlorhexidine    Skin preparation agent: Skin preparation agent completely dried prior to procedure    Anesthesia (see MAR for exact dosages):      Anesthesia method:  Local infiltration    Local anesthetic:  Lidocaine 1% w/o epi  Procedure details:     Location:  R femoral    Procedural supplies:  Triple lumen Landmarks identified: yes      Ultrasound guidance: yes      Sterile ultrasound techniques: Sterile gel and sterile probe covers were used      Number of attempts:  1    Successful placement: yes    Post-procedure details:     Post-procedure:  Dressing applied and line sutured    Assessment:  Blood return through all ports and free fluid flow    Patient tolerance of procedure: Tolerated well, no immediate complications        CRITICAL CARE TIME   CRITICAL CARE: There was a high probability of clinically significant/life threatening deterioration in this patient's condition which required my urgent intervention. Total critical care time was 86 minutes. This excludes any time for separately reportable procedures. CONSULTS:  None    EMERGENCY DEPARTMENT COURSE and DIFFERENTIAL DIAGNOSIS/MDM:   Vitals:    Vitals:    12/19/21 1337 12/19/21 1353 12/19/21 1419 12/19/21 1437   BP: (!) 138/45 (!) 121/41  (!) 139/48   Pulse: 78 78  76   Resp: 14 14 16 14   Temp:       TempSrc:       SpO2: 94% 96%  91%       Patient was given the following medications:  Medications   pantoprazole (PROTONIX) injection 40 mg (40 mg IntraVENous Given 12/19/21 1159)     And   sodium chloride (PF) 0.9 % injection 10 mL (10 mLs IntraVENous Given 12/19/21 1159)   norepinephrine (LEVOPHED) 16 mg in dextrose 5% 250 mL infusion (2 mcg/min IntraVENous New Bag 12/19/21 1257)   dextrose 5 % and 0.45 % sodium chloride infusion ( IntraVENous New Bag 12/19/21 1300)   0.9 % sodium chloride bolus (0 mLs IntraVENous Stopped 12/19/21 1358)   0.9 % sodium chloride bolus (0 mLs IntraVENous Stopped 12/19/21 1359)   dexamethasone (DECADRON) injection 4 mg (4 mg IntraVENous Given 12/19/21 1259)       Patient's blood gas was surprisingly good although that PaO2 was actually while on BiPAP with 93% oxygen. So she clearly is very hypoxic but she is not hypercarbic or significantly acidotic on blood gas. She did have a bicarb of 14.  She was given a total of 3 L of normal saline. Blood pressure would transiently come up to the 90 systolic range then eventually we got one of the 119 so I was cautiously optimistic that she would not need pressors. She then also demonstrated a large melanotic stool and became hypotensive again. So, at that point she did get a central line placed. She was given Protonix. Type and screen was sent. She was started on Levophed and her blood pressure did stabilize. FINAL IMPRESSION      1. Acute renal failure, unspecified acute renal failure type ([address]    2. Hypotension, unspecified hypotension type    3. Pneumonia due to COVID-19 virus    4. Hypoxia    5. Dehydration, severe    6. Hypernatremia    7. Gastrointestinal hemorrhage, unspecified gastrointestinal hemorrhage type          DISPOSITION/PLAN    DISPOSITION Decision To Admit 12/19/2021 12:44:05 PM      PATIENT REFERRED TO:  No follow-up provider specified.     DISCHARGE MEDICATIONS:  New Prescriptions    No medications on file       DISCONTINUED MEDICATIONS:  Discontinued Medications    No medications on file              (Please note that portions of this note were completed with a voice recognition program.  Efforts were made to editthe dictations but occasionally words are mis-transcribed.)    John Colón MD (electronically signed)           John Colón MD  12/19/21 6688

## 2021-12-20 NOTE — H&P
Hospital Medicine History & Physical      PCP: Jacinto Robledo MD    Date of Admission: 12/19/2021    Date of Service: Pt seen/examined on 12/19/21 and Admitted to Inpatient     Chief Complaint:  SOB      History Of Present Illness: The patient is a 66 y.o. female who presents to [facility] with SOB. Pt is a resident of NH, recently tested positive for covid, was found with hypoxia and worsening SOOB and hence sent to the ER. In ER, found to have dark stools with GI bleed, became hypotensive, started on levophed and admitted to ICU    Past Medical History:        Diagnosis Date    Alzheimer's dementia ([facility] [address]     Anemia     Bipolar affective disorder, current episode manic ([address]     Cognitive communication deficit     COPD (chronic obstructive pulmonary disease) ([address]     Coronary atherosclerosis of native coronary artery     Generalized anxiety disorder     GERD without esophagitis     Hypertension     Other lack of coordination     Schizoaffective disorder, unspecified ([address]     Unspecified abnormalities of gait and mobility        Past Surgical History:    History reviewed. No pertinent surgical history. Medications Prior to Admission:    Prior to Admission medications    Not on File       Allergies:  Penicillins and Ibuprofen    Social History:  The patient currently lives in [address] [place] [address] [place]:   has no history on file for tobacco use. ETOH:   has no history on file for alcohol use. Family History:  Reviewed in detail and negative for DM, Early CAD, Cancer, CVA. Positive as follows:    History reviewed. No pertinent family history.     REVIEW OF SYSTEMS:   Could not be obtained    PHYSICAL EXAM:    BP (!) 123/94   Pulse 92   Temp 98.3 °F (36.8 °C) (Axillary)   Resp 21   Ht 5' 5\" (1.651 m)   Wt 121 lb 11.1 oz (55.2 kg)   SpO2 98%   BMI 20.25 kg/m²     General appearance: on BIPAP  HEENT Normal cephalic, atraumatic without obvious deformity. Pupils equal, round, and reactive to light. Extra ocular muscles intact. Conjunctivae/corneas clear. Neck: Supple, No jugular venous distention/bruits. Trachea midline without thyromegaly or adenopathy with full range of motion. Lungs: diminished b/l  Heart: Regular rate and rhythm with Normal S1/S2   Abdomen: Soft, non-tender or non-distended without rigidity or guarding and positive bowel sounds   Extremities: No clubbing, cyanosis, or edema bilaterally. Skin: Skin color, texture, turgor normal.  No rashes or lesions.   Neurologic: grossly non focal, could not be fully assessed  Mental status: could not be assessed  Capillary Refill: Acceptable  < 3 seconds  Peripheral Pulses: +3 Easily felt, not easily obliterated with pressure      CBC   Recent Labs     12/19/21  1006 12/19/21 2027   WBC 14.0*  --    HGB 8.6* 8.3*   HCT 27.8* 26.1*   *  --       RENAL  Recent Labs     12/19/21  1006   *   K 4.1   *   CO2 14*   BUN 97*   CREATININE 4.5*     LFT'S  Recent Labs     12/19/21  1006   AST 21   ALT 14   BILITOT 0.3   ALKPHOS 61     COAG  Recent Labs     12/19/21  1006   INR 1.30*     CARDIAC ENZYMES  Recent Labs     12/19/21  1006   TROPONINI 0.04*       U/A:    Lab Results   Component Value Date    COLORU DK YELLOW 12/19/2021    WBCUA 14 12/19/2021    RBCUA 1 12/19/2021    BACTERIA 1+ 12/19/2021    CLARITYU TURBID 12/19/2021    SPECGRAV 1.025 12/19/2021    LEUKOCYTESUR Negative 12/19/2021    BLOODU Negative 12/19/2021    GLUCOSEU Negative 12/19/2021       ABG    Lab Results   Component Value Date    JBN0DQJ 17.2 12/19/2021    BEART -6.7 12/19/2021    C2TQMECT 93.0 12/19/2021    PHART 7.392 12/19/2021    CDY7IEI 28.3 12/19/2021    PO2ART 70.8 12/19/2021    WUT6YYS 18.1 12/19/2021           Active Hospital Problems    Diagnosis Date Noted    Pneumonia due to COVID-19 virus [U07.1, J12.82] 12/19/2021         PHYSICIANS CERTIFICATION:    I certify that Armando Abad is expected to be hospitalized for > than 2 midnights based on the following assessment and plan:      ASSESSMENT/PLAN:    Acute hypoxic resp failure - placed on BIPAP in ER, admit to ICU, critical care consulted    Acute COVID 19 viral PNA - recently tested positive in NH. Started on dexa, pharmacy consulted for actemra vs baricitinib, check CRP and monitor inflammatory markers    Sepsis - POA, 2/2 above, placed on levophed, monitor    GI bleed - NPO for now, GI consulted, on PPI gtt, monitor hb    ARF - suspect pre-renal, started on IVF, monitor creat, avoid nephrotoxic meds, might need nephro eval if no improvement in creat    Hypernatremia - suspect 2/2 hypovolemia, started on IVF, monitor          DVT Prophylaxis: SCD  Diet: Diet NPO  Code Status: Full Code  PT/OT Eval Status: not indicated    Dispo - admit to Jake Alvares MD    Thank you Anola Schaumann, MD for the opportunity to be involved in this patient's care. If you have any questions or concerns please feel free to contact me at [phone].

## 2021-12-20 NOTE — CONSULTS
[facility]  Palliative Care   Consult Note    NAME:  Jong Contreras RECORD NUMBER:  [id number]  AGE: 66 y.o. GENDER: female  : 1950  TODAY'S DATE:  2021    Subjective     Reason for Consult:  goals of care and code status  Visit Type: Initial Consult      Antonia Fuentes is a 66 y.o. female referred by:  [x] Physician    PAST MEDICAL HISTORY      Diagnosis Date    Alzheimer's dementia ([facility] [address]     Anemia     Bipolar affective disorder, current episode manic ([facility]     Cognitive communication deficit     COPD (chronic obstructive pulmonary disease) ([facility] [address]     Coronary atherosclerosis of native coronary artery     Generalized anxiety disorder     GERD without esophagitis     Hypertension     Other lack of coordination     [facility]     Unspecified abnormalities of gait and mobility        PAST SURGICAL HISTORY  History reviewed. No pertinent surgical history. FAMILY HISTORY  History reviewed. No pertinent family history. SOCIAL HISTORY  Social History     Tobacco Use    Smoking status: Unknown If Ever Smoked    Smokeless tobacco: Not on file   Substance Use Topics    Alcohol use: Not on file    Drug use: Not on file       ALLERGIES  Allergies   Allergen Reactions    Penicillins Shortness Of Breath, Swelling, Hives and Other (See Comments)    Ibuprofen        MEDICATIONS  No current facility-administered medications on file prior to encounter.      Current Outpatient Medications on File Prior to Encounter   Medication Sig Dispense Refill    aspirin 81 MG chewable tablet Take 81 mg by mouth daily      cilostazol (PLETAL) 50 MG tablet Take 50 mg by mouth 2 times daily      albuterol-ipratropium (COMBIVENT RESPIMAT)  MCG/ACT AERS inhaler Inhale 2 puffs into the lungs every 6 hours as needed for Wheezing or Shortness of Breath      lisinopril (PRINIVIL;ZESTRIL) 5 MG tablet Take 5 mg by mouth daily      melatonin 3 MG TABS tablet Take 3 mg by mouth nightly      metoprolol tartrate (LOPRESSOR) 25 MG tablet Take 12.5 mg by mouth 2 times daily      omeprazole (PRILOSEC) 20 MG delayed release capsule Take 20 mg by mouth daily      potassium chloride (KLOR-CON) 20 MEQ packet Take 20 mEq by mouth daily      QUEtiapine (SEROQUEL) 25 MG tablet Take 25 mg by mouth 2 times daily      acetaminophen (TYLENOL) 325 MG tablet Take 650 mg by mouth every 6 hours as needed for Pain or Fever         Objective         BP (!) 167/54   Pulse 107   Temp 98.9 °F (37.2 °C) (Axillary)   Resp 26   Ht 5' 5\" (1.651 m)   Wt 122 lb 5.7 oz (55.5 kg)   SpO2 90%   BMI 20.36 kg/m²     Code Status: DNR CC    Advanced Directives: not completed only has one son Loretta Bal [phone]     Assessment        Management and Education    Persons available for education:     [] Self     [] Caregiver       [] Spouse       [x] Other Family Member   []  Other    Spiritual History:  notified: Yes,     Does the patient have a Primary Care Physician? Yes    Level of patient/caregiver understanding able to:        [x] Verbalize Understanding   [] Demonstrate Understanding       [] Teach Back       [] Needs Reinforcement     []  Other:      Teaching Time:  0hours  30 minutes     Plan        Social Service Consult Made:  Yes  Assistance filling out Living Will/HPOA:  No      Discharge Plan:  Education/support to family  Providing support for coping/adaptation/distress of family  Clarification of medical condition to patient and family  Code status clarified: [facility]  Palliative care orders introduced    Discharge Environment:  [] Hospice Consult Agency:  [] Inpatient Hospice    [] Home with Hospice Care   [] ECF with Hospice  [] ECF skilled care with Hospice to follow   [] Other:    Discussion: Patient admitted from ECF with shortness of breath, COVID positive currently on bipap, oxygen level dropping. I have called her son Elizabet Fagan to discuss goals of care.  He gives a history of his mom being in ECF for past couple of years after his father . He feels she did have early Alzheimer but was not diagnosed. We discussed code status and he knows she wants to be DNR CC and would want comfort care at this time. He will be up later to say his good bye and pursue comfort. Dr Larry Hurd informed      I will continue to follow Ms. Lima's care as needed. Thank you for allowing me to participate in the care of Ms. Lima .      Electronically signed by Devan Vaenssa RN, [address] [place] [address] on 2021 at 12:35 PM  [address] [place] [address]  Office: [phone]

## 2021-12-20 NOTE — CARE COORDINATION
[id number] [facility] Wound Ostomy Continence Nurse  Consult Note       NAME:  Ryland Benitez  MEDICAL RECORD NUMBER:  [id number]  AGE: 66 y.o. GENDER: female  : 1950  TODAY'S DATE:  2021    Subjective   Reason for WOCN Evaluation and Assessment: Suspected DTI POA      Ryland Benitez is a 66 y.o. female referred by:   [] Physician  [x] Nursing  [] Other:     Wound Identification:  Wound Type: pressure  Contributing Factors: chronic pressure and decreased mobility    Wound History: Wounds POA. Currently in ICU on bipap in COVID isolation. Current Wound Care Treatment:  Foam border    Patient Goal of Care:  [x] Wound Healing  [] Odor Control  [] Palliative Care  [] Pain Control   [] Other:         PAST MEDICAL HISTORY        Diagnosis Date    Alzheimer's dementia ([facility] [address]     Anemia     Bipolar affective disorder, current episode manic ([facility])     Cognitive communication deficit     COPD (chronic obstructive pulmonary disease) ([facility]     Coronary atherosclerosis of native coronary artery     Generalized anxiety disorder     GERD without esophagitis     Hypertension     Other lack of coordination     Schizoaffective disorder, unspecified ([facility] [address] [facility]     Unspecified abnormalities of gait and mobility        PAST SURGICAL HISTORY    History reviewed. No pertinent surgical history. FAMILY HISTORY    History reviewed. No pertinent family history. SOCIAL HISTORY    Social History     Tobacco Use    Smoking status: Unknown If Ever Smoked    Smokeless tobacco: Not on file   Substance Use Topics    Alcohol use: Not on file    Drug use: Not on file       ALLERGIES    Allergies   Allergen Reactions    Penicillins Shortness Of Breath, Swelling, Hives and Other (See Comments)    Ibuprofen        MEDICATIONS    No current facility-administered medications on file prior to encounter.      Current Outpatient Medications on File Prior to Encounter   Medication Sig Dispense Refill    aspirin 81 MG chewable tablet Take 81 mg by mouth daily      cilostazol (PLETAL) 50 MG tablet Take 50 mg by mouth 2 times daily      albuterol-ipratropium (COMBIVENT RESPIMAT)  MCG/ACT AERS inhaler Inhale 2 puffs into the lungs every 6 hours as needed for Wheezing or Shortness of Breath      lisinopril (PRINIVIL;ZESTRIL) 5 MG tablet Take 5 mg by mouth daily      melatonin 3 MG TABS tablet Take 3 mg by mouth nightly      metoprolol tartrate (LOPRESSOR) 25 MG tablet Take 12.5 mg by mouth 2 times daily      omeprazole (PRILOSEC) 20 MG delayed release capsule Take 20 mg by mouth daily      potassium chloride (KLOR-CON) 20 MEQ packet Take 20 mEq by mouth daily      QUEtiapine (SEROQUEL) 25 MG tablet Take 25 mg by mouth 2 times daily      acetaminophen (TYLENOL) 325 MG tablet Take 650 mg by mouth every 6 hours as needed for Pain or Fever         Objective    BP (!) 167/54   Pulse 107   Temp 98.9 °F (37.2 °C) (Axillary)   Resp 26   Ht 5' 5\" (1.651 m)   Wt 122 lb 5.7 oz (55.5 kg)   SpO2 90%   BMI 20.36 kg/m²     LABS:  WBC:    Lab Results   Component Value Date    WBC 10.5 12/20/2021     H/H:    Lab Results   Component Value Date    HGB 8.9 12/20/2021    HCT 28.7 12/20/2021     PTT:    Lab Results   Component Value Date    APTT 27.4 12/19/2021   [APTT}  PT/INR:    Lab Results   Component Value Date    PROTIME 16.4 12/20/2021    INR 1.43 12/20/2021     HgBA1c:  No results found for: LABA1C    Assessment   Maxwell Risk Score: Maxwell Scale Score: 10    Patient Active Problem List   Diagnosis Code    Pneumonia due to COVID-19 virus U07.1, J12.82    Acute renal failure ([facility] [address] N17.9    Dehydration, severe E86.0    Hypotension I95.9       Measurements:  Wound 12/20/21 Sacrum (Active)   Wound Image   12/20/21 1226   Wound Etiology Deep tissue/Injury 12/20/21 1226   Wound Length (cm) 2.5 cm 12/20/21 1226   Wound Width (cm) 1 cm 12/20/21 1226   Wound Surface Area (cm^2) 2.5 cm^2 12/20/21 1226   Wound Assessment Purple/maroon;Non-blanchable erythema 12/20/21 1226   Liyah-wound Assessment Denuded 12/20/21 1226   Number of days: 0     L elbow        Pt with AMS on bipap. Pt skin assessed. Pt has DTI to gluteal cleft on sacrum, redness to periwound skin. Possible DTI to L elbow. Skin protectant in place on pt bridge of nose. No skin breakdown currently. Specialty bed ordered. Plan   Plan of Care:    -moisture barrier to buttocks; venelex 2x daily and PRN  -turn and reposition every 2 hours  -elevate heels, apply liquid barrier film twice daily  Will continue to follow.     Specialty Bed Required : Yes   [x] Low Air Loss   [] Pressure Redistribution  [] Fluid Immersion  [] Bariatric  [] Total Pressure Relief  [] Other:     Current Diet: Diet NPO  ADULT TUBE FEEDING; Nasogastric; Standard with Fiber; Continuous; 20; Yes; 10; Q 4 hours; 40; 200; Q 4 hours  Dietician consult:  N/A    Discharge Plan:  Placement for patient upon discharge:TBD  Patient appropriate for Outpatient [address]: No    Referrals:  []   [] [facility]  [] Supplies  [] Other    Patient/Caregiver Teaching:  Level of patient/caregiver understanding able to:   [] Indicates understanding       [] Needs reinforcement  [] Unsuccessful      [] Verbal Understanding  [] Demonstrated understanding       [] No evidence of learning  [] Refused teaching         [x] N/A       Electronically signed by Dhara Cruz RN, on 12/20/2021 at 12:28 PM

## 2021-12-20 NOTE — FLOWSHEET NOTE
provided emotional support and ministerial presence for patient's son and girl friend at the end of life.

## 2021-12-20 NOTE — PROGRESS NOTES
I called [facility] and confirmed that the patient tested positive for covid on 2021. Electronically signed by Alo Mays RN on 2021 at 10:32 AM    I attempted to call the patient's sister, Mary Solis (listed on her nursing home paperwork) at [phone]. It rang once and then went to [facility] and I was unable to leave one because it was not set up, however, she did call back right away. I got a new number for Keith Gudio [phone]. She informed me that the contact Marine Friedman, on the nursing home paperwork, is . I updated her on the patients admission and condition. I called Keith Guido and was able to speak with him and update him on his mothers admission to the hospital, condition, and plan of care. I asked for consent for a PICC line and Actemra and he asked for some time to [facility] into these things\" because he wasn't sure of next steps. I did clarify her code status and informed him of her increasing oxygen demands and potentially needed to be intubated soon based on the increase in oxygen demands and he said that he \"knows her wishes but those are not his wishes\" and to \"continue with everything for now\" while he gets some things together. I did inform him that palliative care would be reaching out to him as well. I updated his contact information and removed Tiana Crystal as he in .   Electronically signed by Alo Mays RN on 2021 at 10:56 AM

## 2021-12-20 NOTE — PROGRESS NOTES
I attempted to call the patients emergency contact, her son, Loretta Bal at [phone] but it rang busy. On the patients paperwork sent by the nursing home she has another son, Darlin Sainz, listed as an emergency contact. I called him at [phone] and got a voicemail. I left one requesting a call back.   Electronically signed by Wendi Meneses RN on 12/20/2021 at 10:23 AM

## 2021-12-20 NOTE — PROGRESS NOTES
Cardiac time of death was called to [address] [place] [address] at [phone] and the body was released.   Referral number [id number]  Electronically signed by Handy Castillo RN on 12/20/2021 at 5:55 PM

## 2021-12-20 NOTE — SIGNIFICANT EVENT
Called to pronounce patient. 66yo woman with COVID PNA with severe hypoxia. Condition worsening. Comfort measures pursued. General appearance: unresponsive. HEENT pupils fixed and dilated. Neck: no breath sounds. Lungs: no breath sounds. Heart: no heart sounds. Mental status: unresponsive. Pt  on 2021 at 16:24. Please forward requests for additional documentation to the attending physician.      Jim Berkowitz MD  2021  5:36 PM

## 2021-12-20 NOTE — PROGRESS NOTES
4 Eyes Skin Assessment     NAME:  Teena Booth  YOB: 1950  MEDICAL RECORD NUMBER:  [id number]    The patient is being assess for  Shift Handoff    I agree that 2 RN's have performed a thorough Head to Toe Skin Assessment on the patient. ALL assessment sites listed below have been assessed. Areas assessed by both nurses:    Head, Face, Ears, Shoulders, Back, Chest, Arms, Elbows, Hands, Sacrum. Buttock, Coccyx, Ischium and Legs. Feet and Heels        Does the Patient have a Wound? Other Yes. Pt has bruising/purple non-blanchable area in intergluteal cleft. Wound care consult ordered.         Maxwell Prevention initiated:  Yes   Wound Care Orders initiated:  Yes    Pressure Injury (Stage 3,4, Unstageable, DTI, NWPT, and Complex wounds) if present place consult order under [de-identified] Yes    New and Established Ostomies if present place consult order under : NA      Nurse 1 eSignature: Electronically signed by Annel Thompson RN on 12/19/21 at 7:19 PM EST    **SHARE this note so that the co-signing nurse is able to place an eSignature**    Nurse 2 eSignature: Electronically signed by Shay Rose RN on 12/19/21 at 9:20 PM EST

## 2021-12-20 NOTE — CONSULTS
REASON FOR CONSULTATION/CC: covid, full code      Consult at request of Alyssa Long MD     PCP: Gisella Tuttle MD  Established Pulmonologist:  None    HISTORY OF PRESENT ILLNESS: Mana Patricio is a 66y.o. year old female with a history of  who presents with     Patient was brought to the emergency room yesterday secondary to decreased responsiveness and worsening hypoxemia. Patient is known to be Covid positive \"recently \"but not clear from documentation exactly when the patient tested positive. Patient started on BiPAP in the emergency room secondary to hypoxemia. Wake but not answering question       Limited history from the patient secondary to Alzheimer's    This note may have been  transcribed using [id number] [employer]. Please disregard any translational errors. Assessment:        COVID-19 pneumonia  Acute hypoxemic respiratory failure saturation less than 90% on room air  Sepsis secondary to viral infection  Acute renal failure  Metabolic acidosis  Hyponatremia  GI bleed  History of Alzheimer's  Chart history of COPD  History hypertension  Schizoaffective disorder with bipolar      Plan:      Hospital Day 1     COVID-19 pneumonia with acute hypoxemic respiratory failure   *  *Actemra x1  *Dexamethasone 20 mg daily x5 days then 10 mg December 24 x5 days  *Remdesivir ordered  *Continues on BiPAP 60% FiO2. *Extensive findings on chest x-ray. Concerning prognosis given age, extent of disease, hypernatremia, acute renal failure all the negative predictive for survival.  * worsening hypoxemia . Check ABG      Acute renal failure  *Continue IV fluids. Improving. Metabolic acidosis with hypernatremia  *Change half-normal saline to bicarb 2 A      Shock  *Weaned off Levophed.   Discontinue norepinephrine      GI bleed  *Protonix drip       Diabetes mellitus  *Lantus 20 units nightly  *Lispro sliding scale    Schizoaffective disorder with bipolar  * seroquel guaiFENesin-dextromethorphan, glucose, dextrose, glucagon (rDNA), dextrose    ALLERGIES:  Patient is allergic to penicillins and ibuprofen. REVIEW OF SYSTEMS:  Awake but not answering questions     Objective:   PHYSICAL EXAM:  Blood pressure (!) 165/52, pulse 106, temperature 99.8 °F (37.7 °C), temperature source Axillary, resp. rate 29, height 5' 5\" (1.651 m), weight 121 lb 11.1 oz (55.2 kg), SpO2 91 %.'  Gen: No distress. Eyes: PERRL. No sclera icterus. No conjunctival injection. ENT: No discharge. Pharynx clear. External appearance of ears and nose normal.  Neck: Trachea midline. No obvious mass. Resp: No accessory muscle use. No crackles. No wheezes. No rhonchi. CV: Regular rate. Regular rhythm. No murmur or rub. No edema. GI: Non-tender. Non-distended. No hernia. Skin: Warm, dry, normal texture and turgor. No nodule on exposed extremities. Lymph: No cervical LAD. No supraclavicular LAD. M/S: No cyanosis. No clubbing. No joint deformity. Neuro: Moves all four extremities.     Psych: wake       Data Reviewed:   LABS:  CBC:   Recent Labs     12/19/21  1006 12/19/21 2027 12/20/21  0400   WBC 14.0*  --   --    HGB 8.6* 8.3* 9.5*   HCT 27.8* 26.1* 30.5*   MCV 89.3  --   --    *  --   --      BMP:   Recent Labs     12/19/21  1006 12/20/21  0400   * 158*   K 4.1 3.5   * 128*   CO2 14* 15*   BUN 97* 80*   CREATININE 4.5* 2.6*     LIVER PROFILE:   Recent Labs     12/19/21  1006   AST 21   ALT 14   BILITOT 0.3   ALKPHOS 61     PT/INR:   Recent Labs     12/19/21  1006   PROTIME 14.8*   INR 1.30*     APTT:   Recent Labs     12/19/21  1006   APTT 27.4     UA:  Recent Labs     12/19/21  1154   COLORU DK YELLOW   PHUR 5.0   WBCUA 14*   RBCUA 1   BACTERIA 1+*   CLARITYU TURBID*   SPECGRAV 1.025   LEUKOCYTESUR Negative   UROBILINOGEN 0.2   BILIRUBINUR MODERATE*   BLOODU Negative   GLUCOSEU Negative     Recent Labs     12/19/21  1013   PHART 7.392   TRS9OKP 28.3*   PO2ART 70.8* Vent Information  Skin Assessment: Clean, dry, & intact  FiO2 : (S) 60 %  SpO2: 91 %  SpO2/FiO2 ratio: 151.67  I Time/ I Time %: 0.9 s  Mask Type: Full face mask  Mask Size: Medium    Radiology Review:  Pertinent images / reports were reviewed as a part of this visit. CT Chest w/ contrast: No results found for this or any previous visit. CT Chest w/o contrast: No results found for this or any previous visit. CTPA: No results found for this or any previous visit. CXR PA/LAT: No results found for this or any previous visit. CXR portable: Results for orders placed during the hospital encounter of 12/19/21    XR CHEST PORTABLE    Narrative  EXAMINATION:  ONE XRAY VIEW OF THE CHEST    12/19/2021 10:09 am    COMPARISON:  None. HISTORY:  ORDERING SYSTEM PROVIDED HISTORY: infection  TECHNOLOGIST PROVIDED HISTORY:  Reason for exam:->infection  Reason for Exam: Respiratory Distress (COVID+, responsive to pain    FINDINGS:  The cardiomediastinal silhouette is unremarkable status post median  sternotomy. Aortic vascular calcification. Peripheral ground-glass  opacification in the mid and lower lung fields. No pleural fluid or evidence  of overt failure. Impression  Peripheral ground-glass pulmonary infiltrates most consistent with COVID-19  pneumonia.

## 2021-12-20 NOTE — PROGRESS NOTES
[facility]    We were consulted for GI bleeding  Chart notes reviewed  The patient's clinical condition continues to deteriorate and the family is pursuing comfort care measures  We will not formally consult  Please call if there is a change in status/plans  Thank you

## 2021-12-20 NOTE — CARE COORDINATION
Case Management Assessment           Initial Evaluation                Date / Time of Evaluation: 12/20/2021 11:37 AM                 Assessment Completed by: Dominique Pickett RN     Pt is currently in the ICU on BiPAP. She has a history of dementia. A call was placed to her son, Sarmad Chen. Left a message requesting call back. Call placed to Mika Flowers [phone] to confirm level of care. Spoke with Conrad Farrar in admissions. Pt is a long-term care resident. She usually stays n the memory care unit. She is alert and oriented x 2 but confused. She needs assistance and cues for ADL's. She was moved to the COVID+ unit at the facility last week. She is welcome to return at discharge. Palliative care has been consulted.       Patient Name: Olivia Velazquez     YOB: 1950  Diagnosis: Hypernatremia [E87.0]  Hypoxia [R09.02]  Dehydration, severe [E86.0]  Hypotension, unspecified hypotension type [I95.9]  Acute renal failure, unspecified acute renal failure type ([facility] [address] [N17.9]  Gastrointestinal hemorrhage, unspecified gastrointestinal hemorrhage type [K92.2]  Pneumonia due to COVID-19 virus [U07.1, J12.82]     Date / Time: 12/19/2021  9:40 AM    Patient Admission Status: Inpatient    Current PCP: Drew Ramirez MD    Chart Reviewed: Yes  Patient/ Family Interviewed: Yes    Emergency Contacts:  Extended Emergency Contact Information  Primary Emergency Contact: Aldo Barriga  Mobile Phone: [phone]  Relation: Child  Secondary Emergency Contact: austinnica Nawaf  Mobile Phone: [phone]  Relation: Brother/Sister    Advance Directives:   Code Status: Full Code    Financial  Payor: MEDICARE / Plan: MEDICARE PART A AND B / Product Type: *No Product type* /     [facility] Needs assistance and cues  Support Systems:  LTC facility and family    HOUSING  Home Environment: LTCF  Steps: NONE    Plans to RETURN to current housing: Yes    Home Oxygen and Respiratory Equipment  Has HOME OXYGEN prior to admission: No    DISCHARGE PLAN:  Disposition: Return to long term [facility] for discharge: EMS transportation     The Patient and/or patient representative Candida Collado and her family were provided with a choice of provider and agrees with the discharge plan Yes    Freedom of choice list was provided with basic dialogue that supports the patient's individualized plan of care/goals and shares the quality data associated with the providers.  Yes      Tamara Oreilly RN  Case Management  [phone]

## 2021-12-20 NOTE — PROGRESS NOTES
I called the patient's son, Jerry Yanez, to let him know of the patients continued deterioration. Currently on bipap with pressure support of14/5 on 100% fiO2 and sating around 76-80%. She is now unresponsive and has mottling to her RLE. He said he will get in his car and head up to see her.   Electronically signed by Robina Huang RN on 12/20/2021 at 2:39 PM

## 2021-12-20 NOTE — PROGRESS NOTES
4 Eyes Skin Assessment     NAME:  Mayank Ho  YOB: 1950  MEDICAL RECORD NUMBER:  [id number]    The patient is being assess for  Transfer to New Unit    I agree that 2 RN's have performed a thorough Head to Toe Skin Assessment on the patient. ALL assessment sites listed below have been assessed. Areas assessed by both nurses:    Head, Face, Ears, Shoulders, Back, Chest, Arms, Elbows, Hands, Sacrum. Buttock, Coccyx, Ischium and Legs. Feet and Heels        Does the Patient have a Wound? Yes. Pt has bruising/purple non-blanchable area on intergluteal cleft. Wound consult ordered. Area in Head to toe assessment.        Maxwell Prevention initiated:  Yes   Wound Care Orders initiated:  YES    Pressure Injury (Stage 3,4, Unstageable, DTI, NWPT, and Complex wounds) if present place consult order under [de-identified] Yes    New and Established Ostomies if present place consult order under : NA      Nurse 1 eSignature: Electronically signed by Rachell Marx RN on 12/19/21 at 7:17 PM EST    **SHARE this note so that the co-signing nurse is able to place an eSignature**    Nurse 2 eSignature: Electronically signed by Randy Patton RN on 12/19/21 at 7:21 PM EST

## 2021-12-20 NOTE — PROGRESS NOTES
Patient's son and his girlfriend arrived to the unit. The son wished to enter the patients room. Risks of infection were explained to him and he still wished to proceed. Lissett Sal assisted the patient in donning the appropriate PPE. I discussed comfort medications with him after he entered the room and he said he would like her to receive them. Alyssa HARDY placed orders for comfort medications.   Electronically signed by Kay Dominguez RN on 12/20/2021 at 3:26 PM

## 2021-12-20 NOTE — PROGRESS NOTES
Attempted twice to call pt's son, Bridger , listed in emergency contacts to give updates on pt's condition but line was busy. Will try to follow up at a later time.     Electronically signed by Haim Roman RN on 12/19/2021 at 9:22 PM

## 2021-12-20 NOTE — PLAN OF CARE
Problem: Skin Integrity:  Goal: Will show no infection signs and symptoms  Description: Will show no infection signs and symptoms  Outcome: Ongoing  Goal: Absence of new skin breakdown  Description: Absence of new skin breakdown  Outcome: Ongoing     Problem: Falls - Risk of:  Goal: Will remain free from falls  Description: Will remain free from falls  Outcome: Ongoing  Goal: Absence of physical injury  Description: Absence of physical injury  Outcome: Ongoing     Problem: SAFETY  Goal: Free from accidental physical injury  Outcome: Ongoing  Goal: Free from intentional harm  Outcome: Ongoing     Problem: DAILY CARE  Goal: Daily care needs are met  Outcome: Ongoing     Problem: PAIN  Goal: Patient's pain/discomfort is manageable  Outcome: Ongoing     Problem: KNOWLEDGE DEFICIT  Goal: Patient/S.O. demonstrates understanding of disease process, treatment plan, medications, and discharge instructions. Outcome: Ongoing     Problem: DISCHARGE BARRIERS  Goal: Patient's continuum of care needs are met  Outcome: Ongoing     Problem: Airway Clearance - Ineffective  Goal: Achieve or maintain patent airway  Outcome: Ongoing     Problem: Gas Exchange - Impaired  Goal: Absence of hypoxia  Outcome: Ongoing  Goal: Promote optimal lung function  Outcome: Ongoing     Problem: Breathing Pattern - Ineffective  Goal: Ability to achieve and maintain a regular respiratory rate  Outcome: Ongoing     Problem:  Body Temperature -  Risk of, Imbalanced  Goal: Ability to maintain a body temperature within defined limits  Outcome: Ongoing  Goal: Will regain or maintain usual level of consciousness  Outcome: Ongoing  Goal: Complications related to the disease process, condition or treatment will be avoided or minimized  Outcome: Ongoing     Problem: Isolation Precautions - Risk of Spread of Infection  Goal: Prevent transmission of infection  Outcome: Ongoing     Problem: Nutrition Deficits  Goal: Optimize nutritional status  Outcome: Ongoing Problem: Risk for Fluid Volume Deficit  Goal: Maintain normal heart rhythm  Outcome: Ongoing  Goal: Maintain absence of muscle cramping  Outcome: Ongoing  Goal: Maintain normal serum potassium, sodium, calcium, phosphorus, and pH  Outcome: Ongoing     Problem: Loneliness or Risk for Loneliness  Goal: Demonstrate positive use of time alone when socialization is not possible  Outcome: Ongoing     Problem: Fatigue  Goal: Verbalize increase energy and improved vitality  Outcome: Ongoing     Problem: Patient Education: Go to Patient Education Activity  Goal: Patient/Family Education  Outcome: Ongoing

## 2021-12-20 NOTE — PROGRESS NOTES
Progress Note  Admit Date: 12/19/2021       CC:   Chief Complaint   Patient presents with    Respiratory Distress     COVID+, responsive to pain, 70% on 3L per EMS       Overnight Events: Pt seen and examined at bedside. Awake and alert to voice, though unable to answer most questions. Scheduled Medications:    potassium chloride  20 mEq IntraVENous Q1H    calcium gluconate-NaCl  1,000 mg IntraVENous Once    insulin lispro  0-18 Units SubCUTAneous Q4H    QUEtiapine  25 mg Oral BID    sodium chloride flush  5-40 mL IntraVENous 2 times per day    dexamethasone  20 mg IntraVENous Q24H    Followed by   Marquita Najjar ON 12/24/2021] dexamethasone  10 mg IntraVENous Q24H    insulin glargine  20 Units SubCUTAneous Nightly      PRN Medications: sodium chloride flush, sodium chloride, ondansetron **OR** ondansetron, polyethylene glycol, acetaminophen **OR** acetaminophen, guaiFENesin-dextromethorphan, glucose, dextrose, glucagon (rDNA), dextrose  Diet: Diet NPO  ADULT TUBE FEEDING; Nasogastric; Standard with Fiber; Continuous; 20; Yes; 10; Q 4 hours; 40; 200; Q 4 hours    Continuous Infusions:   sodium bicarbonate infusion 150 mL/hr at 12/20/21 0855    sodium chloride 5 mL/hr (12/19/21 2008)    pantoprazole 8 mg/hr (12/20/21 0855)    dextrose         PHYSICAL EXAM:  BP (!) 167/54   Pulse 107   Temp 98.9 °F (37.2 °C) (Axillary)   Resp (!) 33   Ht 5' 5\" (1.651 m)   Wt 122 lb 5.7 oz (55.5 kg)   SpO2 91%   BMI 20.36 kg/m²   Recent Labs     12/19/21  2043 12/19/21  2219 12/19/21  2343 12/20/21  0353 12/20/21  0832   POCGLU 296* 282* 263* 176* 150*       Intake/Output Summary (Last 24 hours) at 12/20/2021 1117  Last data filed at 12/20/2021 1003  Gross per 24 hour   Intake 2438.91 ml   Output 840 ml   Net 1598.91 ml       General appearance: No apparent distress, appears stated age. On BiPAP  HEENT: Pupils equal, round, and reactive to light. Conjunctivae/corneas clear. Neck: Supple, with full range of motion. No jugular venous distention. Trachea midline. Respiratory:  Normal respiratory effort. Clear to auscultation, bilaterally without Rales/Wheezes/Rhonchi. Cardiovascular: Tachycardic with regular rhythm with normal S1/S2 without murmurs, rubs or gallops. Abdomen: Soft, non-tender, non-distended with normal bowel sounds. Musculoskeletal: No clubbing, cyanosis or edema bilaterally. Full range of motion without deformity. Skin: Skin color, texture, turgor normal.  No rashes or lesions. Neurologic:  Moving extremities spontaneously  Psychiatric: Alert, cannot assess orientation    LABS:  Recent Labs     12/19/21  1006 12/19/21  1006 12/19/21 2027 12/20/21  0400 12/20/21  0620   WBC 14.0*  --   --   --  10.5   HGB 8.6*   < > 8.3* 9.5* 8.9*   HCT 27.8*   < > 26.1* 30.5* 28.7*   *  --   --   --  362    < > = values in this interval not displayed. Recent Labs     12/19/21  1006 12/20/21  0400 12/20/21  0620   * 158* 158*   K 4.1 3.5 3.4*   * 128* 131*   CO2 14* 15* 14*   BUN 97* 80* 76*   CREATININE 4.5* 2.6* 2.5*   GLUCOSE 138* 190* 144*     Recent Labs     12/19/21  1006 12/20/21  0620   AST 21 25   ALT 14 15   BILITOT 0.3 0.3   ALKPHOS 61 57     Recent Labs     12/19/21  1006   TROPONINI 0.04*     No results for input(s): BNP in the last 72 hours. No results for input(s): CHOL, HDL in the last 72 hours.     Invalid input(s): LDLCALCU  Recent Labs     12/19/21  1006 12/20/21  0620   INR 1.30* 1.43*       Assessment & Plan:      Septic Shock  COVID 19 Pneumonia  Acute hypoxemic respiratory failure   - placed on BiPAP in ER, admit to ICU, critical care consulted  - Remains on BiPAP  - recently tested positive in [place].   - Started on dexa, pharmacy consulted for actemra vs baricitinib  - Momentarily on Levophed, now off  - Palliative Care consult     GI bleed   - NPO for now  - GI consulted,   - PPI gtt  - monitor hb     JACLYN   -

## 2021-12-20 NOTE — ACP (ADVANCE CARE PLANNING)
Advance Care Planning     Advance Care Planning Activator (Inpatient)  Conversation Note      Date of ACP Conversation: 12/20/2021     Conversation Conducted with:  Healthcare Decision Maker: Next of Kin by law (only applies in absence of above) (name) Zac Cm    ACP Activator: Haley Luu RN    Health Care Decision Maker:     Current Designated Health Care Decision Maker:     Primary Decision Maker: Blayne Alexander - [phone]    The palliative care RN, Cleveland Lemus, spoke with pt's son, Soham Torres, regarding code status. Pt is to be a limited code; no intubation/reintubation, no defibrillation/cardioversion, no chest compressions, no resuscitative medications. Care Preferences    Ventilation: \"If you were in your present state of health and suddenly became very ill and were unable to breathe on your own, what would your preference be about the use of a ventilator (breathing machine) if it were available to you? \"      Would the patient desire the use of ventilator (breathing machine)?: no    \"If your health worsens and it becomes clear that your chance of recovery is unlikely, what would your preference be about the use of a ventilator (breathing machine) if it were available to you? \"     Would the patient desire the use of ventilator (breathing machine)?: No      Resuscitation  \"CPR works best to restart the heart when there is a sudden event, like a heart attack, in someone who is otherwise healthy. Unfortunately, CPR does not typically restart the heart for people who have serious health conditions or who are very sick. \"    \"In the event your heart stopped as a result of an underlying serious health condition, would you want attempts to be made to restart your heart (answer \"yes\" for attempt to resuscitate) or would you prefer a natural death (answer \"no\" for do not attempt to resuscitate)? \" no       [x] Yes   [] No   Educated Patient / Decision Maker regarding differences between Advance Directives and portable DNR orders.     Length of ACP Conversation in minutes:      Conversation Outcomes:  [x] ACP discussion completed  [] Existing advance directive reviewed with patient; no changes to patient's previously recorded wishes  [] New Advance Directive completed  [] Portable Do Not Rescitate prepared for Provider review and signature  [] POLST/POST/MOLST/MOST prepared for Provider review and signature      Follow-up plan:    [] Schedule follow-up conversation to continue planning  [] Referred individual to Provider for additional questions/concerns   [] Advised patient/agent/surrogate to review completed ACP document and update if needed with changes in condition, patient preferences or care setting    [x] This note routed to one or more involved healthcare providers

## 2021-12-23 LAB
BLOOD CULTURE, ROUTINE: NORMAL
CULTURE, BLOOD 2: NORMAL